# Patient Record
(demographics unavailable — no encounter records)

---

## 2025-03-19 NOTE — PHYSICAL EXAM

## 2025-03-19 NOTE — HISTORY OF PRESENT ILLNESS
[FreeTextEntry1] : Thank you for consult.  Patient is a 68 y/o male with a PMHx of Umbilical Hernia, b/l Inguinal Hernias, hypertension, Adrenal Nodule (never f/u with Urologist) GERD, Gastritis, Colon Polyps, and Internal Hemorrhoids, who presents c/o on and off heartburn and diarrhea s/p recent travel to [place]. Last EGD was 11/26/2019, which showed gastritis and was negative for H. Pylori. Last colonoscopy was 8/27/2019, which was unremarkable except for Internal Hemorrhoids, prep was fair. Pt was instructed to f/u in 3 years for next colonoscopy but did not f/u until today.

## 2025-03-19 NOTE — ASSESSMENT
[FreeTextEntry1] : Stool tests sent, will f/u with results. Dexilant 60 mg prescribed, instructed pt to take once daily in the morning. Referred pt for CT Abdomen and Pelvis for f/u of adrenal lesion, will f/u with results. Pt expressed understanding and agrees with the plan.  A low acid/reflux diet was discussed in great detail including not smoking, not drinking alcohol, and not consuming foods that irritate the esophagus. It is helpful to eat small meals throughout the day instead of large meals. You should avoid eating before bedtime or lying down after you eat. It can be helpful to raise the head of your bed six inches. Additionally, you should maintain a healthy weight and good posture. The patient was given written material to take home and review.  We discussed diarrhea at length. Treatment depends on the cause and severity of your diarrhea. You should drink plenty of fluids to avoid dehydration. You should avoid drinks that contain caffeine and milk. Milk may make diarrhea worse. Your doctor may recommend hydration drinks for your infant or child. People with severe dehydration may need fluid replacement via an IV line and hospitalization. Avoid eating greasy foods, fatty foods, and alcohol. Bananas, applesauce, rice, and toast are helpful foods to eat. If you feel too sick to eat, try sucking on ice chips until you can tolerate food.  I spent 45 minutes with the patient as well as reviewing documents prior to and after the office visit. Patient verbalized understanding of all information provided. All questions answered and reviewed.  Robert Brunner, MD

## 2025-03-19 NOTE — PHYSICAL EXAM

## 2025-05-01 NOTE — REVIEW OF SYSTEMS
[As Noted in HPI] : as noted in HPI [Constipation] : constipation [Diarrhea] : diarrhea [Heartburn] : heartburn [Negative] : Heme/Lymph [FreeTextEntry7] : bowel movements alternating between constipation and diarrhea

## 2025-05-01 NOTE — REASON FOR VISIT
[Follow-up] : a follow-up of an existing diagnosis [Home] : at home, [unfilled] , at the time of the visit. [Medical Office: ([facility])___] : at the medical office located in  [Telephone (audio)] : This telephonic visit was provided via audio only technology. [Patient preference] : patient preference. [Verbal consent obtained from patient] : the patient, [unfilled] [FreeTextEntry1] : bowel movements alternating between constipation and diarrhea, GERD

## 2025-05-01 NOTE — ASSESSMENT
[FreeTextEntry1] : Linzess 72 mcg samples will be provided, instructed pt to take once daily. Referred pt for EGD-Colonoscopy. Pt expressed understanding and agrees with the plan. Pt states he will come into the office to  the Linzess samples and schedule his EGD-Colonoscopy.  A low acid/reflux diet was discussed in great detail including not smoking, not drinking alcohol, and not consuming foods that irritate the esophagus. It is helpful to eat small meals throughout the day instead of large meals. You should avoid eating before bedtime or lying down after you eat. It can be helpful to raise the head of your bed six inches. Additionally, you should maintain a healthy weight and good posture. The patient was given written material to take home and review.  The causes of constipation were discussed at length. We discussed: Eat three meals each day. Do not skip meals. Gradually increase the amount of FIBER in your diet. Choose more whole grain breads, cereals, and rice. Select more raw fruits and vegetables and eat the peel, if appropriate. Read food labels and look for the "dietary fiber" content of foods. Good sources have 2 grams of fiber or more. Drink six to eight glasses of water each day. Limit highly refined and processed foods.  We discussed diarrhea at length. Treatment depends on the cause and severity of your diarrhea. You should drink plenty of fluids to avoid dehydration. You should avoid drinks that contain caffeine and milk. Milk may make diarrhea worse. Your doctor may recommend hydration drinks for your infant or child. People with severe dehydration may need fluid replacement via an IV line and hospitalization. Avoid eating greasy foods, fatty foods, and alcohol. Bananas, applesauce, rice, and toast are helpful foods to eat. If you feel too sick to eat, try sucking on ice chips until you can tolerate food.  An upper GI endoscopy or EGD (esophagogastroduodenoscopy) is a procedure to diagnose and treat problems in your upper GI (gastrointestinal) tract.  The upper GI tract includes your food pipe (esophagus), stomach, and the first part of your small intestine (the duodenum).  This procedure is done using a long, flexible tube called an endoscope. The tube has a tiny light and video camera on one end. The tube is put into your mouth and throat. Then it is slowly pushed through your esophagus and stomach, and into your duodenum. Video images from the tube are seen on a monitor.  Small tools may also be inserted into the endoscope. These tools can be used to:  Take tissue samples for a biopsy Remove things such as food that may be stuck in the upper GI tract Inject air or fluid Stop bleeding do procedures such as endoscopic surgery, laser therapy, or open (dilate) a narrowed area  A colonoscopy is an exam of the lower part of the gastrointestinal tract, which is called the colon or large intestine (bowel). Colonoscopy is a safe procedure that provides information that other tests may not be able to give.  Colonoscopy is performed by inserting a device called a colonoscope into the anus and advancing through the entire colon. The procedure generally takes between 20 minutes and one hour.  Other tests that are sometimes used to screen for colon cancer, like virtual colonoscopy (also called CT colonography), were discussed separately.  REASONS FOR COLONOSCOPY:  The most common reasons for colonoscopy are:  1. To screen for colon polyps (growths of tissue in the colon) or colon cancer  2. Rectal bleeding  3. A change in bowel habits, like persistent diarrhea  4. Iron deficiency anemia (a decrease in blood count due to loss of iron)  5. A family history of colon cancer  6. A personal history of colon polyps or colon cancer  7. Chronic, unexplained abdominal or rectal pain  8. An abnormal X-Ray exam, like a barium enema or CT scan.   COLONOSCOPY PREPARATION: Before colonoscopy, your colon must be completely cleaned out so that the doctor can see any abnormal areas. This is vitally important to increase the chances that your doctor will identify abnormalities in your colon. If your colon is not completely cleaned out, the chances your doctor will miss abnormalities increases. Your doctor's office will provide specific instructions about how you should prepare for your colonoscopy. Be sure to read these instructions as soon as you get them so you will know how to take the preparation and whether you need to make any changes to your medications or diet. If you have questions, call the doctor's office in advance.  I spent 35 minutes reviewing the patient's records prior to arrival, with the patient, and reviewing records after the visit. All prior testing reviewed at length. Patient verbalized understanding of all information provided. All questions answered and reviewed.  Robert Brunner, MD

## 2025-05-01 NOTE — HISTORY OF PRESENT ILLNESS
[FreeTextEntry1] : Patient is a 69 y/o male with a PMHx of Umbilical Hernia, b/l Inguinal Hernias, hypertension, Adrenal Nodule, GERD, Gastritis, Colon Polyps, and Internal Hemorrhoids, currently on Dexilant 60 mg once daily, who presents c/o minimal heartburn and bowel movements alternating between constipation and diarrhea x 1 week. Pt reports that the diarrhea he originally had turned to constipation, but states that the Linzess 145 mcg was too strong for him and gave him diarrhea. Last EGD was 11/26/2019, which showed gastritis and was negative for H. Pylori. Last colonoscopy was 8/27/2019, which was unremarkable except for Internal Hemorrhoids, prep was fair. Pt was instructed to f/u in 3 years for next colonoscopy but did not f/u until today.

## 2025-07-30 NOTE — HISTORY OF PRESENT ILLNESS
[FreeTextEntry1] : Patient is a 71 y/o male with a PMHx of Umbilical Hernia, b/l Inguinal Hernias, hypertension, Adrenal Nodule, GERD, Gastritis, Colon Polyps, and Internal Hemorrhoids, currently on Dexilant 60 mg once daily, who presents for an ECO performed on 6/24/25 which showed polyp in the descending colon, internal hemorrhoids, gastritis and no h-pylori. Pt was advised to repeat in 5 years. He is currently asymptomatic and feeling well.

## 2025-07-30 NOTE — REASON FOR VISIT
[Post-op/Procedure: _________] : a [unfilled] post-op/procedure visit [Home] : at home, [unfilled] , at the time of the visit. [Medical Office: ([facility])___] : at the medical office located in  [Telephone (audio)] : This telephonic visit was provided via audio only technology. [Patient preference] : patient preference.

## 2025-07-30 NOTE — ASSESSMENT
[FreeTextEntry1] : Pt was advised to repeat colonoscopy in 5 years Pt was advised to f/u in office in 2 to 3 months   A low acid/reflux diet was discussed in great detail including not smoking, not drinking alcohol, and not consuming foods that irritate the esophagus. It is helpful to eat small meals throughout the day instead of large meals. You should avoid eating before bedtime or lying down after you eat. It can be helpful to raise the head of your bed six inches. Additionally, you should maintain a healthy weight and good posture. The patient was given written material to take home and review.  The causes of constipation were discussed at length. We discussed: Eat three meals each day. Do not skip meals. Gradually increase the amount of FIBER in your diet. Choose more whole grain breads, cereals, and rice. Select more raw fruits and vegetables and eat the peel, if appropriate. Read food labels and look for the "dietary fiber" content of foods. Good sources have 2 grams of fiber or more. Drink six to eight glasses of water each day. Limit highly refined and processed foods.  We discussed diarrhea at length. Treatment depends on the cause and severity of your diarrhea. You should drink plenty of fluids to avoid dehydration. You should avoid drinks that contain caffeine and milk. Milk may make diarrhea worse. Your doctor may recommend hydration drinks for your infant or child. People with severe dehydration may need fluid replacement via an IV line and hospitalization. Avoid eating greasy foods, fatty foods, and alcohol. Bananas, applesauce, rice, and toast are helpful foods to eat. If you feel too sick to eat, try sucking on ice chips until you can tolerate food.  I spent 35 minutes reviewing the patient's records prior to arrival, with the patient, and reviewing records after the visit. All prior testing reviewed at length. Patient verbalized understanding of all information provided. All questions answered and reviewed.  Robert Brunner, MD